# Patient Record
Sex: FEMALE | Race: WHITE
[De-identification: names, ages, dates, MRNs, and addresses within clinical notes are randomized per-mention and may not be internally consistent; named-entity substitution may affect disease eponyms.]

---

## 2018-01-21 ENCOUNTER — HOSPITAL ENCOUNTER (OUTPATIENT)
Dept: HOSPITAL 31 - C.EROB | Age: 33
Setting detail: OBSERVATION
Discharge: HOME | End: 2018-01-21
Attending: OBSTETRICS & GYNECOLOGY | Admitting: OBSTETRICS & GYNECOLOGY
Payer: COMMERCIAL

## 2018-01-21 VITALS — BODY MASS INDEX: 29.8 KG/M2

## 2018-01-21 DIAGNOSIS — O47.1: Primary | ICD-10-CM

## 2018-01-21 DIAGNOSIS — O99.013: ICD-10-CM

## 2018-01-21 DIAGNOSIS — Z3A.39: ICD-10-CM

## 2018-01-21 LAB
ALBUMIN SERPL-MCNC: 3.5 G/DL (ref 3.5–5)
ALBUMIN/GLOB SERPL: 0.9 {RATIO} (ref 1–2.1)
ALT SERPL-CCNC: 18 U/L (ref 9–52)
AST SERPL-CCNC: 17 U/L (ref 14–36)
BACTERIA #/AREA URNS HPF: (no result) /[HPF]
BASOPHILS # BLD AUTO: 0 K/UL (ref 0–0.2)
BASOPHILS NFR BLD: 0.4 % (ref 0–2)
BILIRUB UR-MCNC: NEGATIVE MG/DL
BUN SERPL-MCNC: 9 MG/DL (ref 7–17)
CALCIUM SERPL-MCNC: 8.8 MG/DL (ref 8.6–10.4)
EOSINOPHIL # BLD AUTO: 0.1 K/UL (ref 0–0.7)
EOSINOPHIL NFR BLD: 0.9 % (ref 0–4)
ERYTHROCYTE [DISTWIDTH] IN BLOOD BY AUTOMATED COUNT: 15.7 % (ref 11.5–14.5)
GFR NON-AFRICAN AMERICAN: > 60
GLUCOSE UR STRIP-MCNC: NORMAL MG/DL
HGB BLD-MCNC: 10.1 G/DL (ref 11–16)
LEUKOCYTE ESTERASE UR-ACNC: (no result) LEU/UL
LYMPHOCYTES # BLD AUTO: 2 K/UL (ref 1–4.3)
LYMPHOCYTES NFR BLD AUTO: 24.5 % (ref 20–40)
MCH RBC QN AUTO: 26.2 PG (ref 27–31)
MCHC RBC AUTO-ENTMCNC: 33 G/DL (ref 33–37)
MCV RBC AUTO: 79.5 FL (ref 81–99)
MONOCYTES # BLD: 0.3 K/UL (ref 0–0.8)
MONOCYTES NFR BLD: 4.1 % (ref 0–10)
NEUTROPHILS # BLD: 5.7 K/UL (ref 1.8–7)
NEUTROPHILS NFR BLD AUTO: 70.1 % (ref 50–75)
NRBC BLD AUTO-RTO: 0.1 % (ref 0–2)
PH UR STRIP: 6 [PH] (ref 5–8)
PLATELET # BLD: 184 K/UL (ref 130–400)
PMV BLD AUTO: 10.3 FL (ref 7.2–11.7)
PROT UR STRIP-MCNC: NEGATIVE MG/DL
RBC # BLD AUTO: 3.85 MIL/UL (ref 3.8–5.2)
RBC # UR STRIP: NEGATIVE /UL
SP GR UR STRIP: 1 (ref 1–1.03)
SQUAMOUS EPITHIAL: 1 /HPF (ref 0–5)
URINE NITRATE: NEGATIVE
UROBILINOGEN UR-MCNC: NORMAL MG/DL (ref 0.2–1)
WBC # BLD AUTO: 8.2 K/UL (ref 4.8–10.8)

## 2018-01-21 PROCEDURE — 86850 RBC ANTIBODY SCREEN: CPT

## 2018-01-21 PROCEDURE — 80053 COMPREHEN METABOLIC PANEL: CPT

## 2018-01-21 PROCEDURE — 85025 COMPLETE CBC W/AUTO DIFF WBC: CPT

## 2018-01-21 PROCEDURE — 86703 HIV-1/HIV-2 1 RESULT ANTBDY: CPT

## 2018-01-21 PROCEDURE — 86900 BLOOD TYPING SEROLOGIC ABO: CPT

## 2018-01-21 PROCEDURE — 86592 SYPHILIS TEST NON-TREP QUAL: CPT

## 2018-01-21 PROCEDURE — 81001 URINALYSIS AUTO W/SCOPE: CPT

## 2018-01-21 NOTE — OBDCSUM
===========================

Datetime: 01/21/2018 09:37

===========================

   

Discharged to, Provider:  Home

Follow up at, Provider:  Dr Velasco

Discharge Time:  01/21/2018 09:37

Follow up in weeks, Provider:  this week

Discharge Comment, Provider:  labor precautions given

Discharge Diagnosis Prov Other:  false labor

Contraception after Delivery:  Not Planning to Use

## 2018-01-21 NOTE — OBDCSUM
===========================

Datetime: 01/21/2018 09:42

===========================

   

Discharged to, Provider:  Home

Follow up at, Provider:  Dr. Velasco

Disch Instr Activity:  Normal activity

Disch Instr Diet:  Regular

Discharge Diet restrict Prov:  na at this time

Discharge Time:  01/21/2018 09:38

Follow up in weeks, Provider:  thursday

Disch Referrals:  None

Discharge Comment, Provider:  labor purecatuiosn given

Discharge Diagnosis Prov Other:  false labor

   

===========================

Datetime: 01/21/2018 09:37

===========================

   

Discharged to, Provider:  Home

Follow up at, Provider:  Dr Velasco

Discharge Time:  01/21/2018 09:37

Follow up in weeks, Provider:  this week

Discharge Comment, Provider:  labor precautions given

Discharge Diagnosis Prov Other:  false labor

Contraception after Delivery:  Not Planning to Use

## 2018-01-21 NOTE — OBPN
===========================

Datetime: 01/21/2018 01:16

===========================

   

Membranes, Provider:  Intact

Contraction Comments Provider:  q 5 min/iregular

FHR - Baseline A Provider:  150

Gestation - Est Wks by US:  39.5

Fetal Presentation-Admit:  Vertex

IP Progress Note Comment:  pt admited for observation overnight

   pt seen and reexamined denies any pain, reports irregular non painful contraction, denies any lof,
 vb, +FM

   Pt was reexamined, uncahnged

   closed on exam

      

   plan

   dsicharge as per pmd

   labor purecaiotn given 

Vital Signs Provider:  Reviewed; Within Normal Limits

NICHD Accel Fetus A IP Provider:  [id number]

FHR Category Provider Fetus A:  Category I

NICHD Variability Prov Fetus A:  Moderate 6-25bpm

Dilatation, Provider:  0

NICHD Decel Fetus A IP Provider:  None

## 2018-01-22 ENCOUNTER — HOSPITAL ENCOUNTER (INPATIENT)
Dept: HOSPITAL 31 - C.EROB | Age: 33
LOS: 2 days | Discharge: HOME | End: 2018-01-24
Attending: OBSTETRICS & GYNECOLOGY | Admitting: OBSTETRICS & GYNECOLOGY
Payer: COMMERCIAL

## 2018-01-22 VITALS — BODY MASS INDEX: 29.8 KG/M2

## 2018-01-22 DIAGNOSIS — Z3A.39: ICD-10-CM

## 2018-01-22 NOTE — OBADHP
===========================

Datetime: 2018 21:30

===========================

   

Admit Comment, IP Provider:   at 39.6weeks came with ctxs started in am, irrg , 6/0, no vb, lof=f
m

   obhx 1 x 

   pmh den

   me pnv

   all nkda

   psh den

   soch den

      

   ve 3-4/70/-2

      

   a/p  at 39=weks in active labor

   admit to l_d

   npo/ivf

   labs

   pain donal

   cont cristina and efm

   anticipate 

Pelvic Type - PN:  Adequate

Extremities - PN:  Normal

Abdomen - PN:  Normal

Back - PN:  Normal

Breast - PN:  Normal

Lungs - PN:  Normal

Heart - PN:  Normal

Thyroid - PN:  Normal

Neurologic - PN:  Normal

HEENT - PN:  Normal

General - PN:  Normal

FHR - Baseline A Provider:  140

Contraction Comments Provider:  q1-4

Comments, ACOG Physical Exam:  gravid,non tender

   ext no edea,no calf ten

IP Prenatal Hx Assessment:  The Prenatal History has been Reviewed and is Current

Vital Signs Provider:  Reviewed; Within Normal Limits

IP Chief Complaint:  Uterine contractions

NICHD Variability Prov Fetus A:  Moderate 6-25bpm

NICHD Accel Fetus A IP Provider:  [id number]

FHR Category Provider Fetus A:  Category I

Dilatation, Provider:  4

Effacement, Provider:  70

Station, Provider:  -2

Genitourinary Exam:  Normal

DTRs - PN:  Normal

EGA AdmitDate IP:  39.6

IP Adm Impression:  Term, intrauterine pregnancy; Active labor

IP Admit Plan:  Admit to unit; Initiate labor protocol

   

===========================

Datetime: 2018 01:16

===========================

   

Fetal Presentation-Admit:  Vertex

Membranes, Provider:  Intact

Gestation - Est Wks by US:  39.5

NICHD Decel Fetus A IP Provider:  None

## 2018-01-22 NOTE — OBHP
===========================

Datetime: 2018 21:30

===========================

   

IP Adm Impression:  Term, intrauterine pregnancy; Active labor

IP Admit Plan:  Admit to unit; Initiate labor protocol

Admit Comment, IP Provider:   at 39.6weeks came with ctxs started in am, irrg , 6/0, no vb, lof=f
m

   obhx 1 x 

   pmh den

   me pnv

   all nkda

   psh den

   soch den

      

   ve 3-/-2

      

   a/p  at 39=weks in active labor

   admit to l_d

   npo/ivf

   labs

   pain donal

   cont cristina and efm

   anticipate 

Pelvic Type - PN:  Adequate

Extremities - PN:  Normal

Abdomen - PN:  Normal

Back - PN:  Normal

Breast - PN:  Normal

Lungs - PN:  Normal

Heart - PN:  Normal

Thyroid - PN:  Normal

Neurologic - PN:  Normal

HEENT - PN:  Normal

General - PN:  Normal

FHR - Baseline A Provider:  140

Contraction Comments Provider:  q1-4

Comments, ACOG Physical Exam:  gravid,non tender

   ext no edea,no calf ten

IP Prenatal Hx Assessment:  The Prenatal History has been Reviewed and is Current

EGA AdmitDate IP:  39.6

Vital Signs Provider:  Reviewed; Within Normal Limits

IP Chief Complaint:  Uterine contractions

NICHD Variability Prov Fetus A:  Moderate 6-25bpm

NICHD Accel Fetus A IP Provider:  [id number]

FHR Category Provider Fetus A:  Category I

Dilatation, Provider:  4

Effacement, Provider:  70

Station, Provider:  -2

Genitourinary Exam:  Normal

DTRs - PN:  Normal

## 2018-01-23 RX ADMIN — Medication PRN ML: at 06:56

## 2018-01-24 VITALS — TEMPERATURE: 97.1 F | RESPIRATION RATE: 20 BRPM | HEART RATE: 85 BPM | OXYGEN SATURATION: 100 %

## 2018-01-24 VITALS — DIASTOLIC BLOOD PRESSURE: 71 MMHG | SYSTOLIC BLOOD PRESSURE: 96 MMHG

## 2018-01-24 LAB
ERYTHROCYTE [DISTWIDTH] IN BLOOD BY AUTOMATED COUNT: 15.6 % (ref 11.5–14.5)
HGB BLD-MCNC: 9 G/DL (ref 11–16)
MCH RBC QN AUTO: 26.7 PG (ref 27–31)
MCHC RBC AUTO-ENTMCNC: 33.1 G/DL (ref 33–37)
MCV RBC AUTO: 80.5 FL (ref 81–99)
PLATELET # BLD: 157 K/UL (ref 130–400)
PMV BLD AUTO: 9.8 FL (ref 7.2–11.7)
RBC # BLD AUTO: 3.35 MIL/UL (ref 3.8–5.2)
WBC # BLD AUTO: 10.5 K/UL (ref 4.8–10.8)

## 2018-01-24 RX ADMIN — Medication PRN ML: at 17:37

## 2018-01-24 NOTE — OBDCSUM
===========================

Datetime: 01/24/2018 16:49

===========================

   

Discharged to, Provider:  Home

Follow up at, Provider:  Dr. Velasco

Disch Instr Activity:  Normal activity; May Shower

Disch Instr Diet:  Regular

Discharge Instructions, Provider:  Routine instructions given

Discharge Diagnosis, Provider:  Term Pregnancy Delivered

Follow up in weeks, Provider:  4 weeks

Disch Referrals:  None

Contraception discussed, Prov:  Yes

Disch Activity Restrictions:  No sexual activity; Nothing in vagina - [place], tampons, douche

Contraception after Delivery:  Not Planning to Use

## 2018-01-24 NOTE — OBDS
===================================

DELIVERY PERSONNEL

===================================

   

Nurse Midwife Certified:  N/A

Delivery Doctor:  KELLY Velasco MD

Scrub Nurse:  N/ALTAGRACIA

Circulator:  Chelsea Tran RN

Anesthesiologist:  

Anesthetist:  SAME

Resident:  N/ALTAGRACIA

   

===================================

MATERNAL INFORMATION

===================================

   

Delivery Anesthesia:  Local; Epidural

Medications in Delivery:  Pitocin

Estimated  Blood Loss (ml):  300

Placenta Cultured:  No

Maternal Complications:  None

RN Comments:  live baby boy born via  with apgar 9_9

   

===================================

LABOR SUMMARY

===================================

   

EDC:  2018 00:00

No. Babies in Womb:  1

 Attempted:  No

Labor Anesthesia:  Epidural

   

===================================

LABOR INFORMATION

===================================

   

Reason for Induction:  Not Applicable

Onset of Labor:  2018 18:00

Complete Dilatation:  2018 02:36

Group B Beta Strep:  Negative

   

===================================

MEMBRANES

===================================

   

Membranes Rupture Method:  Artificial

Rupture of Membranes:  2018 02:35

Length of Rupture (hrs):  0.37

Amniotic Fluid Color:  Clear

Amniotic Fluid Amount:  Large

Amniotic Fluid Odor:  Normal

   

===================================

STAGES OF LABOR

===================================

   

Stage 1 hrs:  8

Stage 1 min:  36

Stage 2 hrs:  0

Stage 2 min:  21

Stage 3 hrs:  0

Stage 3 min:  7

Total Time in Labor hrs:  9

Total Time in Labor min:  4

   

===================================

VAGINAL DELIVERY

===================================

   

Episiotomy:  Right Mediolateral

Laceration Extension:  N/A

Laceration Type:  None

Laceration Repair:  Yes

Initial Vag Sponge Count:  10

Final Vag Sponge Count:  20

Final Vag Sharps Count:  4

Sponge Count Correct:  Yes; Vaginal Sweep Performed

Sharps Count Correct:  Yes

   

===================================

BABY A INFORMATION

===================================

   

Infant Delivery Date/Time:  2018 02:57

Method of Delivery:  Vaginal

Born in Route :  No

:  N/A

Forceps:  N/A

Vacuum Extraction:  N/A

Shoulder Dystocia :  No

   

===================================

SHOULDER DYSTOCIA BABY A

===================================

   

Infant Delivery Date/Time:  2018 02:57

   

===================================

PRESENTATION/POSITION BABY A

===================================

   

Presentation:  Cephalic

Cephalic Presentation:  Vertex

Vertex Position:  Left Occipital Anterior

Breech Presentation:  N/A

   

===================================

PLACENTA INFORMATION BABY A

===================================

   

Placenta Delivery Time :  2018 03:04

Placenta Method of Delivery:  Spontaneous

Placenta Status:  Delivered

   

===================================

APGAR SCORES BABY A

===================================

   

Heart Rate 1 min:  >100 bpm

Resp Effort 1 min:  Good Cry

Reflex Irritability 1 min:  Cough or Sneeze or Pulls Away

Muscle Tone 1 min:  Active Motion

Color 1 min:  Body Pink, Extremities Blue

APGAR SCORE 1 MIN:  9

Heart Rate 5 min:  >100 bpm

Resp Effort 5 min:  Good Cry

Reflex Irritability 5 min:  Cough or Sneeze or Pulls Away

Muscle Tone 5 min:  Active Motion

Color 5 min:  Body Pink, Extremities Blue

APGAR SCORE 5 MIN:  9

   

===================================

INFANT INFORMATION BABY A

===================================

   

Gestational Age at Delivery:  40.0

Gestational Status:  Term

Infant Outcome :  Liveborn

Infant Condition :  Stable

Infant Sex:  Male

   

===================================

IDENTIFICATION/MEDS BABY A

===================================

   

ID Band Number:  [id number]

ID Band Location:  Left Leg; Left Arm



Sensor Applied:  Yes

Sensor Number:  [id number]

Sensor Location :  Cord Clamp

   

===================================

WEIGHT/LENGTH BABY A

===================================

   

Infant Birthweight (gms):  3525

Infant Weight (lb):  7

Infant Weight (oz):  12

Infant Length Inches:  20.00

Infant Length cms:  50.8

   

===================================

CORD INFORMATION BABY A

===================================

   

No. Cord Vessels:  3

Nuchal Cord :  N/A

Cord Blood Taken:  Yes

Infant Suction:  Mouth; Nose

   

===================================

ASSESSMENT BABY A

===================================

   

Infant Complications:  None

Physical Findings at Delivery:  Within Normal Limits

Infant Respirations:  Appears Normal

Neonatologist/ALS Called :  No

Infant Care By:  B.Reyes,RN/

Transferred To:  Remains with Mother

## 2018-01-24 NOTE — OBPPN
===========================

Datetime: 01/24/2018 16:46

===========================

   

PP Pain Prov:  Within normal limits

PP Breasts Prov:  Normal

PP Heart Prov:  Normal

PP Lungs Prov:  Normal

PP Abdomen/Uterus Prov:  Normal

PP Lochia Prov:  Normal

PP Vulva/Perineum Prov:  Normal

PP CVA Tenderness Prov:  Normal

PP Extremities Prov:  Normal

PP C/S Incision Prov:  Normal

PP Breastfeeding Progress Prov:  Normal

PP Impression Prov:  Normal postpartum progression

PP Plan Prov:  Discharge

PP Progress Note Prov:  PPD   #!

   No C/O

   Abdomen Soft

   HOF  -3

      

   P: Pt wants to go home today

       Home today

IP PP Procedures:  None

## 2018-01-25 NOTE — OBADHP
===========================

Datetime: 01/22/2018 21:30

===========================

   

EGA AdmitDate IP:  39.6